# Patient Record
Sex: FEMALE | Race: WHITE | NOT HISPANIC OR LATINO | Employment: FULL TIME
[De-identification: names, ages, dates, MRNs, and addresses within clinical notes are randomized per-mention and may not be internally consistent; named-entity substitution may affect disease eponyms.]

---

## 2017-08-26 ENCOUNTER — HEALTH MAINTENANCE LETTER (OUTPATIENT)
Age: 42
End: 2017-08-26

## 2019-11-07 ENCOUNTER — HEALTH MAINTENANCE LETTER (OUTPATIENT)
Age: 44
End: 2019-11-07

## 2020-07-01 ENCOUNTER — OFFICE VISIT - RIVER FALLS (OUTPATIENT)
Dept: FAMILY MEDICINE | Facility: CLINIC | Age: 45
End: 2020-07-01

## 2020-07-01 ASSESSMENT — MIFFLIN-ST. JEOR: SCORE: 1970.36

## 2020-11-29 ENCOUNTER — HEALTH MAINTENANCE LETTER (OUTPATIENT)
Age: 45
End: 2020-11-29

## 2021-02-14 ENCOUNTER — HEALTH MAINTENANCE LETTER (OUTPATIENT)
Age: 46
End: 2021-02-14

## 2021-09-25 ENCOUNTER — HEALTH MAINTENANCE LETTER (OUTPATIENT)
Age: 46
End: 2021-09-25

## 2022-01-15 ENCOUNTER — HEALTH MAINTENANCE LETTER (OUTPATIENT)
Age: 47
End: 2022-01-15

## 2022-02-11 VITALS
HEIGHT: 65 IN | BODY MASS INDEX: 48.82 KG/M2 | DIASTOLIC BLOOD PRESSURE: 86 MMHG | TEMPERATURE: 97 F | SYSTOLIC BLOOD PRESSURE: 124 MMHG | HEART RATE: 69 BPM | OXYGEN SATURATION: 98 % | WEIGHT: 293 LBS

## 2022-02-16 NOTE — PROGRESS NOTES
Patient:   SAIRA EASON            MRN: [id number]            FIN: [id number]               Age:   45 years     Sex:  Female     :  1975   Associated Diagnoses:   Pre-employment examination   Author:   Catracho Mcmillan PA-C      Report Summary   Diagnosis  Pre-employment examination ([id number] Z02.1).  Patient Instructions   Visit Information      Date of Service: 2020 09:00 am  Performing Location: [facility]  Encounter#: [id number]      Primary Care Provider (PCP):  97 -UNKNOWN,      Referring Provider:  Catracho Mcmillan PA-C    NPI# [id number]   Visit type:  Pre-employment exam   .    Accompanied by:  No one.    Source of history:  Self.    Referral source:  Self.    History limitation:  None.       Chief Complaint   2020 8:52 AM CDT     [facility] [employer], non dot drug screen        Well Adult History   Well Adult History             The patient presents for well adult exam.  The patient's general health status is described as good.  The patient's diet is described as balanced.  Exercise: routine.  Associated symptoms consist of none.  Last menstrual period: regular.  Additional pertinent history: daily caffeine use, tobacco use none and alcohol use socially.        Review of Systems   Constitutional:  Negative.    Eye:  Negative.    Respiratory:  Negative.    Cardiovascular:  Negative.    Breast:  Negative.    Gastrointestinal:  Negative.    Genitourinary:  Negative.    Gynecologic:  Negative.    Hematology/Lymphatics:  Negative.    Endocrine:  Negative.    Immunologic:  Negative.    Musculoskeletal:  Negative.    Integumentary:  Negative.    Neurologic:  Negative.    Psychiatric:  Negative.       Health Status   Allergies:    Allergic Reactions (All)  No Known Medication Allergies   Medications:  (Selected)   Documented Medications  Documented  Birth Control: Birth Control, 0 Refill(s), Type: Maintenance  Ventolin HFA: Inhale, q6 hrs, 0 Refill(s), Type:  Maintenance  venlafaxine: Oral, 0 Refill(s), Type: Maintenance,    Medications          *denotes recorded medication          *Birth Control: 0 Refill(s).          *Ventolin HFA: Inhale, q6 hrs, 0 Refill(s).          *venlafaxine: Oral, 0 Refill(s).       Problem list:    All Problems  Morbid obesity / SNOMED CT 222613200 / Probable      Histories   Past Medical History:    No active or resolved past medical history items have been selected or recorded.   Family History:    No family history items have been selected or recorded.   Procedure history:    No active procedure history items have been selected or recorded.   Social History:             No active social history items have been recorded.      Physical Examination   Vital Signs   7/1/2020 8:52 AM CDT Temperature Tympanic 97.0 DegF  LOW    Peripheral Pulse Rate 69 bpm    HR Method Manual    Systolic Blood Pressure 124 mmHg    Diastolic Blood Pressure 86 mmHg    Mean Arterial Pressure 99 mmHg    BP Site Right arm    BP Method Manual    Oxygen Saturation 98 %      Measurements from flowsheet : Measurements   7/1/2020 8:52 AM CDT Height Measured - Standard 65 in    Weight Measured - Standard 294.2 lb    BSA 2.47 m2    Body Mass Index 48.95 kg/m2  HI      General:  Alert and oriented, No acute distress.    Eye:  Pupils are equal, round and reactive to light, Extraocular movements are intact, Normal conjunctiva.    HENT:  Normocephalic, Tympanic membranes are clear, Normal hearing, Oral mucosa is moist, No pharyngeal erythema.    Neck:  Supple, Non-tender, No lymphadenopathy, No thyromegaly.    Respiratory:  Lungs are clear to auscultation, Respirations are non-labored, Breath sounds are equal.    Cardiovascular:  Normal rate, Regular rhythm, No murmur.    Gastrointestinal:  Soft, Non-tender, Non-distended, No organomegaly.    Genitourinary:  No costovertebral angle tenderness.    Musculoskeletal:  Normal range of motion, Normal strength, No tenderness, No  swelling.    Integumentary:  Warm, Pink, No rash.    Neurologic:  Alert, Oriented, Normal sensory, Normal motor function, No focal deficits.    Psychiatric:  Cooperative, Appropriate mood & affect.       Impression and Plan   Diagnosis     Pre-employment examination ([id number] Z02.1).     Patient Instructions:       Counseled: Patient, Verbalized understanding.     Cleared for employment without restriction.

## 2022-02-16 NOTE — NURSING NOTE
Comprehensive Intake Entered On:  7/1/2020 9:07 AM CDT    Performed On:  7/1/2020 8:52 AM CDT by Gabriela Wolff CMA               Summary   Chief Complaint :   [facility] [place] [facility], non dot drug screen   Weight Measured :   294.2 lb(Converted to: 294 lb 3 oz, 133.45 kg)    Height Measured :   65 in(Converted to: 5 ft 5 in, 165.10 cm)    Body Mass Index :   48.95 kg/m2 (HI)    Body Surface Area :   2.47 m2   Systolic Blood Pressure :   124 mmHg   Diastolic Blood Pressure :   86 mmHg   Mean Arterial Pressure :   99 mmHg   Peripheral Pulse Rate :   69 bpm   BP Site :   Right arm   BP Method :   Manual   HR Method :   Manual   Temperature Tympanic :   97.0 DegF(Converted to: 36.1 DegC)  (LOW)    Oxygen Saturation :   98 %   Gabriela Wolff CMA - 7/1/2020 8:52 AM CDT   Health Status   Allergies Verified? :   Yes   Medication History Verified? :   Yes   Medical History Verified? :   Yes   Pre-Visit Planning Status :   Completed   Tobacco Use? :   Never smoker   Gabriela Wolff CMA - 7/1/2020 8:52 AM CDT   Consents   Consent for Immunization Exchange :   Consent Granted   Consent for Immunizations to Providers :   Consent Granted   Gabriela Wolff CMA - 7/1/2020 8:52 AM CDT   Meds / Allergies   (As Of: 7/1/2020 9:07:55 AM CDT)   Allergies (Active)   No Known Medication Allergies  Estimated Onset Date:   Unspecified ; Created By:   Gabriela Wolff CMA; Reaction Status:   Active ; Category:   Drug ; Substance:   No Known Medication Allergies ; Type:   Allergy ; Updated By:   Gabriela Wolff CMA; Reviewed Date:   7/1/2020 9:06 AM CDT        Medication List   (As Of: 7/1/2020 9:07:55 AM CDT)   Home Meds    albuterol  :   albuterol ; Status:   Documented ; Ordered As Mnemonic:   Ventolin HFA ; Simple Display Line:   Inhale, q6 hrs, 0 Refill(s) ; Catalog Code:   albuterol ; Order Dt/Tm:   7/1/2020 9:06:00 AM CDT          Miscellaneous Prescription  :   Miscellaneous Prescription ; Status:   Documented ; Ordered As Mnemonic:    Birth Control ; Simple Display Line:   0 Refill(s) ; Catalog Code:   Miscellaneous Prescription ; Order Dt/Tm:   7/1/2020 9:05:52 AM CDT          venlafaxine  :   venlafaxine ; Status:   Documented ; Ordered As Mnemonic:   venlafaxine ; Simple Display Line:   Oral, 0 Refill(s) ; Catalog Code:   venlafaxine ; Order Dt/Tm:   7/1/2020 9:05:46 AM CDT            Vision Testing POC   Corrective Lenses :   Glasses   Eye, Left with Correction Visual Acuity :   20/15   Eye, Right with Correction Visual Acuity :   20/15   Eye, Bilat w/Correction Visual Acuity :   20/15   Gabriela Wolff CMA - 7/1/2020 9:17 AM CDT   ID Risk Screen   Recent Travel History :   No recent travel   Family Member Travel History :   No recent travel   Other Exposure to Infectious Disease :   Unknown   Gabriela Wolff CMA - 7/1/2020 8:52 AM CDT

## 2022-03-06 ENCOUNTER — HEALTH MAINTENANCE LETTER (OUTPATIENT)
Age: 47
End: 2022-03-06

## 2022-12-26 ENCOUNTER — HEALTH MAINTENANCE LETTER (OUTPATIENT)
Age: 47
End: 2022-12-26

## 2023-04-16 ENCOUNTER — HEALTH MAINTENANCE LETTER (OUTPATIENT)
Age: 48
End: 2023-04-16

## 2023-12-26 ENCOUNTER — TRANSFERRED RECORDS (OUTPATIENT)
Dept: MULTI SPECIALTY CLINIC | Facility: CLINIC | Age: 48
End: 2023-12-26

## 2024-01-04 ENCOUNTER — TRANSFERRED RECORDS (OUTPATIENT)
Dept: HEALTH INFORMATION MANAGEMENT | Facility: CLINIC | Age: 49
End: 2024-01-04

## 2024-01-22 ENCOUNTER — TRANSFERRED RECORDS (OUTPATIENT)
Dept: HEALTH INFORMATION MANAGEMENT | Facility: CLINIC | Age: 49
End: 2024-01-22

## 2024-01-22 ENCOUNTER — MEDICAL CORRESPONDENCE (OUTPATIENT)
Dept: HEALTH INFORMATION MANAGEMENT | Facility: CLINIC | Age: 49
End: 2024-01-22

## 2024-01-23 ENCOUNTER — TRANSFERRED RECORDS (OUTPATIENT)
Dept: HEALTH INFORMATION MANAGEMENT | Facility: CLINIC | Age: 49
End: 2024-01-23

## 2024-03-07 ENCOUNTER — OFFICE VISIT (OUTPATIENT)
Dept: FAMILY MEDICINE | Facility: CLINIC | Age: 49
End: 2024-03-07
Payer: COMMERCIAL

## 2024-03-07 VITALS
TEMPERATURE: 98.7 F | WEIGHT: 264 LBS | RESPIRATION RATE: 16 BRPM | SYSTOLIC BLOOD PRESSURE: 125 MMHG | DIASTOLIC BLOOD PRESSURE: 86 MMHG | BODY MASS INDEX: 43.99 KG/M2 | OXYGEN SATURATION: 99 % | HEIGHT: 65 IN | HEART RATE: 72 BPM

## 2024-03-07 DIAGNOSIS — R79.89 ELEVATED SERUM CREATININE: Primary | ICD-10-CM

## 2024-03-07 DIAGNOSIS — R03.0 ELEVATED BLOOD PRESSURE READING WITHOUT DIAGNOSIS OF HYPERTENSION: ICD-10-CM

## 2024-03-07 LAB
ALBUMIN UR-MCNC: NEGATIVE MG/DL
APPEARANCE UR: ABNORMAL
BACTERIA #/AREA URNS HPF: ABNORMAL /HPF
BASOPHILS # BLD AUTO: 0.1 10E3/UL (ref 0–0.2)
BASOPHILS NFR BLD AUTO: 1 %
BILIRUB UR QL STRIP: NEGATIVE
COLOR UR AUTO: YELLOW
EOSINOPHIL # BLD AUTO: 0.2 10E3/UL (ref 0–0.7)
EOSINOPHIL NFR BLD AUTO: 3 %
ERYTHROCYTE [DISTWIDTH] IN BLOOD BY AUTOMATED COUNT: 12.5 % (ref 10–15)
GLUCOSE UR STRIP-MCNC: NEGATIVE MG/DL
HCT VFR BLD AUTO: 43.6 % (ref 35–47)
HGB BLD-MCNC: 14.6 G/DL (ref 11.7–15.7)
HGB UR QL STRIP: ABNORMAL
IMM GRANULOCYTES # BLD: 0 10E3/UL
IMM GRANULOCYTES NFR BLD: 0 %
KETONES UR STRIP-MCNC: ABNORMAL MG/DL
LEUKOCYTE ESTERASE UR QL STRIP: NEGATIVE
LYMPHOCYTES # BLD AUTO: 2.3 10E3/UL (ref 0.8–5.3)
LYMPHOCYTES NFR BLD AUTO: 27 %
MCH RBC QN AUTO: 29.7 PG (ref 26.5–33)
MCHC RBC AUTO-ENTMCNC: 33.5 G/DL (ref 31.5–36.5)
MCV RBC AUTO: 89 FL (ref 78–100)
MONOCYTES # BLD AUTO: 0.6 10E3/UL (ref 0–1.3)
MONOCYTES NFR BLD AUTO: 7 %
NEUTROPHILS # BLD AUTO: 5.3 10E3/UL (ref 1.6–8.3)
NEUTROPHILS NFR BLD AUTO: 63 %
NITRATE UR QL: NEGATIVE
PH UR STRIP: 6 [PH] (ref 5–7)
PLATELET # BLD AUTO: 269 10E3/UL (ref 150–450)
RBC # BLD AUTO: 4.91 10E6/UL (ref 3.8–5.2)
RBC #/AREA URNS AUTO: ABNORMAL /HPF
SP GR UR STRIP: 1.01 (ref 1–1.03)
SQUAMOUS #/AREA URNS AUTO: ABNORMAL /LPF
UROBILINOGEN UR STRIP-ACNC: 0.2 E.U./DL
WBC # BLD AUTO: 8.5 10E3/UL (ref 4–11)
WBC #/AREA URNS AUTO: ABNORMAL /HPF

## 2024-03-07 PROCEDURE — 36415 COLL VENOUS BLD VENIPUNCTURE: CPT | Performed by: INTERNAL MEDICINE

## 2024-03-07 PROCEDURE — 80048 BASIC METABOLIC PNL TOTAL CA: CPT | Performed by: INTERNAL MEDICINE

## 2024-03-07 PROCEDURE — 85025 COMPLETE CBC W/AUTO DIFF WBC: CPT | Mod: QW | Performed by: INTERNAL MEDICINE

## 2024-03-07 PROCEDURE — 86038 ANTINUCLEAR ANTIBODIES: CPT | Performed by: INTERNAL MEDICINE

## 2024-03-07 PROCEDURE — 82043 UR ALBUMIN QUANTITATIVE: CPT | Performed by: INTERNAL MEDICINE

## 2024-03-07 PROCEDURE — 82570 ASSAY OF URINE CREATININE: CPT | Performed by: INTERNAL MEDICINE

## 2024-03-07 PROCEDURE — 81001 URINALYSIS AUTO W/SCOPE: CPT | Performed by: INTERNAL MEDICINE

## 2024-03-07 PROCEDURE — 99204 OFFICE O/P NEW MOD 45 MIN: CPT | Performed by: INTERNAL MEDICINE

## 2024-03-07 RX ORDER — DESVENLAFAXINE 50 MG/1
50 TABLET, FILM COATED, EXTENDED RELEASE ORAL DAILY
COMMUNITY

## 2024-03-07 ASSESSMENT — PATIENT HEALTH QUESTIONNAIRE - PHQ9
SUM OF ALL RESPONSES TO PHQ QUESTIONS 1-9: 3
SUM OF ALL RESPONSES TO PHQ QUESTIONS 1-9: 3

## 2024-03-07 NOTE — LETTER
3/7/2024         RE: Mary Parr   [address] [place] [id number]        Dear Colleague,    Thank you for referring your patient, Mary Parr, to the [facility]. Please see a copy of my visit note below.      Assessment & Plan  Problem List Items Addressed This Visit          Other    Elevated serum creatinine - Primary     Referred to renal clinic for Scr 1.2 (eGFR 48mL/min); measured in context of biliary colic (elective cholecystectomy soon after), the patient denies any significant hypovolemic insults; reportedly with normal eating behaviors at time of lab completion  9/2023: comparator Scr of 0.9 (eGFR ~70s)  1/2024: renal US (reports not available); though reportedly normal  No hemodynamic mediating meds; no NSAIDs use  No vascular dz history, no diabetes    3/2024: repeat Scr improved to 1.0, eGFR 70mL/min  - UA: 10-25 WBCs, 2-5 RBCs (in context of mod squams, + bacteria)  - JENY  - BAN    Given relative improvement in renal function back to normal GFR and historically with similar baseline in Sept, 2023 - I would interpret as normal overall renal function  - would recommend reassessment of Scr, GFR later this year to ensure stability         Relevant Orders    Basic metabolic panel (Completed)    Albumin Random Urine Quantitative with Creat Ratio (Completed)    UA with Microscopic (Completed)    CBC with Platelets & Differential (Completed)    Anti Nuclear Moraima IgG by IFA with Reflex (Completed)    Urine Microscopic Exam (Completed)    Elevated blood pressure reading without diagnosis of hypertension     Historically without hypertension; usually with systolic pressures in the 110s when measured in primary care clinic         Relevant Orders    Basic metabolic panel (Completed)    Albumin Random Urine Quantitative with Creat Ratio (Completed)    UA with Microscopic (Completed)    CBC with Platelets & Differential (Completed)    Anti Nuclear Moraima IgG by IFA with  "Reflex (Completed)    Urine Microscopic Exam (Completed)          45 minutes spent by me on the date of the encounter doing chart review, review of outside records, review of test results, interpretation of tests, and patient visit      BMI  Estimated body mass index is 43.93 kg/m  as calculated from the following:    Height as of this encounter: 1.651 m (5' 5\").    Weight as of this encounter: 119.7 kg (264 lb).           Stoney Hernandez is a 48 year old, presenting for the following health issues: Is referred to me by Dr. Forrest through [facility] for evaluation of elevated creatinine.  She had labs obtained in late December and subsequently the following week in early January in the context of biliary colic.  Had significant transaminase elevation which ultimately led to cholecystectomy.  Labs at that time showed a creatinine of 1.2 on both occasions, estimated GFR 48 mL/min.  She does share preceding labs completed in September drawn by her [facility] health provider with a creatinine of 0.9.  No known history of underlying kidney disease.  No significant family history of any renal disorder.  Uses NSAIDs sparingly without any regular daily use.  Outside of some intermittent psoriasis no other known autoimmune disorders.  She has had 3 pregnancies, the last at age 34; no history of any pregnancy-induced hypertension or preeclampsia or gestational diabetes.  Historically with normotensive status; no history of any hypertension  Referral (Kidney function lab results - referred by [facility] - Nephrology Referral)      3/7/2024     3:21 PM   Additional Questions   Roomed by ALICIA Mesa   Accompanied by self     Via the Health Maintenance questionnaire, the patient has reported the following services have been completed -Mammogram, this information has been sent to the abstraction team.  History of Present Illness       Reason for visit:  Kidney function concerns  Symptom onset:  More than a " "month  Symptoms include:  No symptoms  Symptom intensity:  Mild  Symptom progression:  Staying the same  Had these symptoms before:  No  What makes it worse:  No  What makes it better:  No    She eats 0-1 servings of fruits and vegetables daily.She consumes 0 sweetened beverage(s) daily.She exercises with enough effort to increase her heart rate 10 to 19 minutes per day.  She exercises with enough effort to increase her heart rate 4 days per week. She is missing 1 dose(s) of medications per week.         Review of Systems  Constitutional, HEENT, cardiovascular, pulmonary, gi and gu systems are negative, except as otherwise noted.      Objective    BP (!) 152/83   Pulse 72   Temp 98.7  F (37.1  C) (Tympanic)   Resp 16   Ht 1.651 m (5' 5\")   Wt 119.7 kg (264 lb)   LMP 02/18/2024 (Exact Date)   SpO2 99%   BMI 43.93 kg/m    Body mass index is 43.93 kg/m .  Physical Exam   GENERAL: alert and no distress  NECK: no adenopathy, no asymmetry, masses, or scars  RESP: lungs clear to auscultation - no rales, rhonchi or wheezes  CV: regular rate and rhythm, normal S1 S2, no S3 or S4, no murmur, click or rub, no peripheral edema  ABDOMEN: soft, nontender, no hepatosplenomegaly, no masses and bowel sounds normal  MS: no gross musculoskeletal defects noted, no edema    Results for orders placed or performed in visit on 03/07/24   Basic metabolic panel     Status: Abnormal   Result Value Ref Range    Sodium 139 135 - 145 mmol/L    Potassium 4.0 3.4 - 5.3 mmol/L    Chloride 105 98 - 107 mmol/L    Carbon Dioxide (CO2) 26 22 - 29 mmol/L    Anion Gap 8 7 - 15 mmol/L    Urea Nitrogen 10.6 6.0 - 20.0 mg/dL    Creatinine 0.98 (H) 0.51 - 0.95 mg/dL    GFR Estimate 70 >60 mL/min/1.73m2    Calcium 9.1 8.6 - 10.0 mg/dL    Glucose 91 70 - 99 mg/dL   Albumin Random Urine Quantitative with Creat Ratio     Status: None   Result Value Ref Range    Creatinine Urine mg/dL 111.0 mg/dL    Albumin Urine mg/L <12.0 mg/L    Albumin Urine mg/g Cr   "   UA with Microscopic     Status: Abnormal   Result Value Ref Range    Color Urine Yellow Colorless, Straw, Light Yellow, Yellow    Appearance Urine Slightly Cloudy (A) Clear    Glucose Urine Negative Negative mg/dL    Bilirubin Urine Negative Negative    Ketones Urine Trace (A) Negative mg/dL    Specific Gravity Urine 1.015 1.003 - 1.035    Blood Urine Trace (A) Negative    pH Urine 6.0 5.0 - 7.0    Protein Albumin Urine Negative Negative mg/dL    Urobilinogen Urine 0.2 0.2, 1.0 E.U./dL    Nitrite Urine Negative Negative    Leukocyte Esterase Urine Negative Negative   Anti Nuclear Moraima IgG by IFA with Reflex     Status: Normal   Result Value Ref Range    BAN interpretation Negative Negative   CBC with platelets and differential     Status: None   Result Value Ref Range    WBC Count 8.5 4.0 - 11.0 10e3/uL    RBC Count 4.91 3.80 - 5.20 10e6/uL    Hemoglobin 14.6 11.7 - 15.7 g/dL    Hematocrit 43.6 35.0 - 47.0 %    MCV 89 78 - 100 fL    MCH 29.7 26.5 - 33.0 pg    MCHC 33.5 31.5 - 36.5 g/dL    RDW 12.5 10.0 - 15.0 %    Platelet Count 269 150 - 450 10e3/uL    % Neutrophils 63 %    % Lymphocytes 27 %    % Monocytes 7 %    % Eosinophils 3 %    % Basophils 1 %    % Immature Granulocytes 0 %    Absolute Neutrophils 5.3 1.6 - 8.3 10e3/uL    Absolute Lymphocytes 2.3 0.8 - 5.3 10e3/uL    Absolute Monocytes 0.6 0.0 - 1.3 10e3/uL    Absolute Eosinophils 0.2 0.0 - 0.7 10e3/uL    Absolute Basophils 0.1 0.0 - 0.2 10e3/uL    Absolute Immature Granulocytes 0.0 <=0.4 10e3/uL   Urine Microscopic Exam     Status: Abnormal   Result Value Ref Range    Bacteria Urine Many (A) None Seen /HPF    RBC Urine 2-5 (A) 0-2 /HPF /HPF    WBC Urine 10-25 (A) 0-5 /HPF /HPF    Squamous Epithelials Urine Moderate (A) None Seen /LPF   CBC with Platelets & Differential     Status: None    Narrative    The following orders were created for panel order CBC with Platelets & Differential.  Procedure                               Abnormality         Status                      ---------                               -----------         ------                     CBC with platelets and d...[[id number]]                      Final result                 Please view results for these tests on the individual orders.           Signed Electronically by: Iglesia Dawson MD      Again, thank you for allowing me to participate in the care of your patient.        Sincerely,        Iglesia Dawson MD

## 2024-03-07 NOTE — ASSESSMENT & PLAN NOTE
Historically without hypertension; usually with systolic pressures in the 110s when measured in primary care clinic

## 2024-03-07 NOTE — PROGRESS NOTES
Assessment & Plan   Problem List Items Addressed This Visit          Other    Elevated serum creatinine - Primary     Referred to renal clinic for Scr 1.2 (eGFR 48mL/min); measured in context of biliary colic (elective cholecystectomy soon after), the patient denies any significant hypovolemic insults; reportedly with normal eating behaviors at time of lab completion  9/2023: comparator Scr of 0.9 (eGFR ~70s)  1/2024: renal US (reports not available); though reportedly normal  No hemodynamic mediating meds; no NSAIDs use  No vascular dz history, no diabetes    3/2024: repeat Scr improved to 1.0, eGFR 70mL/min  - UA: 10-25 WBCs, 2-5 RBCs (in context of mod squams, + bacteria)  - JENY  - BAN    Given relative improvement in renal function back to normal GFR and historically with similar baseline in Sept, 2023 - I would interpret as normal overall renal function  - would recommend reassessment of Scr, GFR later this year to ensure stability         Relevant Orders    Basic metabolic panel (Completed)    Albumin Random Urine Quantitative with Creat Ratio (Completed)    UA with Microscopic (Completed)    CBC with Platelets & Differential (Completed)    Anti Nuclear Moraima IgG by IFA with Reflex (Completed)    Urine Microscopic Exam (Completed)    Elevated blood pressure reading without diagnosis of hypertension     Historically without hypertension; usually with systolic pressures in the 110s when measured in primary care clinic         Relevant Orders    Basic metabolic panel (Completed)    Albumin Random Urine Quantitative with Creat Ratio (Completed)    UA with Microscopic (Completed)    CBC with Platelets & Differential (Completed)    Anti Nuclear Moraima IgG by IFA with Reflex (Completed)    Urine Microscopic Exam (Completed)          45 minutes spent by me on the date of the encounter doing chart review, review of outside records, review of test results, interpretation of tests, and patient visit      BMI  Estimated body  "mass index is 43.93 kg/m  as calculated from the following:    Height as of this encounter: 1.651 m (5' 5\").    Weight as of this encounter: 119.7 kg (264 lb).           Stoney Hernandez is a 48 year old, presenting for the following health issues: Is referred to me by Dr. Forrest through [facility] for evaluation of elevated creatinine.  She had labs obtained in late December and subsequently the following week in early January in the context of biliary colic.  Had significant transaminase elevation which ultimately led to cholecystectomy.  Labs at that time showed a creatinine of 1.2 on both occasions, estimated GFR 48 mL/min.  She does share preceding labs completed in September drawn by her [facility] health provider with a creatinine of 0.9.  No known history of underlying kidney disease.  No significant family history of any renal disorder.  Uses NSAIDs sparingly without any regular daily use.  Outside of some intermittent psoriasis no other known autoimmune disorders.  She has had 3 pregnancies, the last at age 34; no history of any pregnancy-induced hypertension or preeclampsia or gestational diabetes.  Historically with normotensive status; no history of any hypertension  Referral (Kidney function lab results - referred by [facility] - Nephrology Referral)      3/7/2024     3:21 PM   Additional Questions   Roomed by ALICIA Mesa   Accompanied by self     Via the Health Maintenance questionnaire, the patient has reported the following services have been completed -Mammogram, this information has been sent to the abstraction team.  History of Present Illness       Reason for visit:  Kidney function concerns  Symptom onset:  More than a month  Symptoms include:  No symptoms  Symptom intensity:  Mild  Symptom progression:  Staying the same  Had these symptoms before:  No  What makes it worse:  No  What makes it better:  No    She eats 0-1 servings of fruits and vegetables daily.She consumes 0 " "sweetened beverage(s) daily.She exercises with enough effort to increase her heart rate 10 to 19 minutes per day.  She exercises with enough effort to increase her heart rate 4 days per week. She is missing 1 dose(s) of medications per week.         Review of Systems  Constitutional, HEENT, cardiovascular, pulmonary, gi and gu systems are negative, except as otherwise noted.      Objective    BP (!) 152/83   Pulse 72   Temp 98.7  F (37.1  C) (Tympanic)   Resp 16   Ht 1.651 m (5' 5\")   Wt 119.7 kg (264 lb)   LMP 02/18/2024 (Exact Date)   SpO2 99%   BMI 43.93 kg/m    Body mass index is 43.93 kg/m .  Physical Exam   GENERAL: alert and no distress  NECK: no adenopathy, no asymmetry, masses, or scars  RESP: lungs clear to auscultation - no rales, rhonchi or wheezes  CV: regular rate and rhythm, normal S1 S2, no S3 or S4, no murmur, click or rub, no peripheral edema  ABDOMEN: soft, nontender, no hepatosplenomegaly, no masses and bowel sounds normal  MS: no gross musculoskeletal defects noted, no edema    Results for orders placed or performed in visit on 03/07/24   Basic metabolic panel     Status: Abnormal   Result Value Ref Range    Sodium 139 135 - 145 mmol/L    Potassium 4.0 3.4 - 5.3 mmol/L    Chloride 105 98 - 107 mmol/L    Carbon Dioxide (CO2) 26 22 - 29 mmol/L    Anion Gap 8 7 - 15 mmol/L    Urea Nitrogen 10.6 6.0 - 20.0 mg/dL    Creatinine 0.98 (H) 0.51 - 0.95 mg/dL    GFR Estimate 70 >60 mL/min/1.73m2    Calcium 9.1 8.6 - 10.0 mg/dL    Glucose 91 70 - 99 mg/dL   Albumin Random Urine Quantitative with Creat Ratio     Status: None   Result Value Ref Range    Creatinine Urine mg/dL 111.0 mg/dL    Albumin Urine mg/L <12.0 mg/L    Albumin Urine mg/g Cr     UA with Microscopic     Status: Abnormal   Result Value Ref Range    Color Urine Yellow Colorless, Straw, Light Yellow, Yellow    Appearance Urine Slightly Cloudy (A) Clear    Glucose Urine Negative Negative mg/dL    Bilirubin Urine Negative Negative    " Ketones Urine Trace (A) Negative mg/dL    Specific Gravity Urine 1.015 1.003 - 1.035    Blood Urine Trace (A) Negative    pH Urine 6.0 5.0 - 7.0    Protein Albumin Urine Negative Negative mg/dL    Urobilinogen Urine 0.2 0.2, 1.0 E.U./dL    Nitrite Urine Negative Negative    Leukocyte Esterase Urine Negative Negative   Anti Nuclear Moraima IgG by IFA with Reflex     Status: Normal   Result Value Ref Range    BAN interpretation Negative Negative   CBC with platelets and differential     Status: None   Result Value Ref Range    WBC Count 8.5 4.0 - 11.0 10e3/uL    RBC Count 4.91 3.80 - 5.20 10e6/uL    Hemoglobin 14.6 11.7 - 15.7 g/dL    Hematocrit 43.6 35.0 - 47.0 %    MCV 89 78 - 100 fL    MCH 29.7 26.5 - 33.0 pg    MCHC 33.5 31.5 - 36.5 g/dL    RDW 12.5 10.0 - 15.0 %    Platelet Count 269 150 - 450 10e3/uL    % Neutrophils 63 %    % Lymphocytes 27 %    % Monocytes 7 %    % Eosinophils 3 %    % Basophils 1 %    % Immature Granulocytes 0 %    Absolute Neutrophils 5.3 1.6 - 8.3 10e3/uL    Absolute Lymphocytes 2.3 0.8 - 5.3 10e3/uL    Absolute Monocytes 0.6 0.0 - 1.3 10e3/uL    Absolute Eosinophils 0.2 0.0 - 0.7 10e3/uL    Absolute Basophils 0.1 0.0 - 0.2 10e3/uL    Absolute Immature Granulocytes 0.0 <=0.4 10e3/uL   Urine Microscopic Exam     Status: Abnormal   Result Value Ref Range    Bacteria Urine Many (A) None Seen /HPF    RBC Urine 2-5 (A) 0-2 /HPF /HPF    WBC Urine 10-25 (A) 0-5 /HPF /HPF    Squamous Epithelials Urine Moderate (A) None Seen /LPF   CBC with Platelets & Differential     Status: None    Narrative    The following orders were created for panel order CBC with Platelets & Differential.  Procedure                               Abnormality         Status                     ---------                               -----------         ------                     CBC with platelets and d...[[id number]]                      Final result                 Please view results for these tests on the individual orders.            Signed Electronically by: Iglesia Dawson MD

## 2024-03-07 NOTE — ASSESSMENT & PLAN NOTE
Referred to renal clinic for Scr 1.2 (eGFR 48mL/min); measured in context of biliary colic (elective cholecystectomy soon after), the patient denies any significant hypovolemic insults; reportedly with normal eating behaviors at time of lab completion  9/2023: comparator Scr of 0.9 (eGFR ~70s)  1/2024: renal US (reports not available); though reportedly normal  No hemodynamic mediating meds; no NSAIDs use  No vascular dz history, no diabetes    3/2024: repeat Scr improved to 1.0, eGFR 70mL/min  - UA: 10-25 WBCs, 2-5 RBCs (in context of mod squams, + bacteria)  - JENY  - BAN    Given relative improvement in renal function back to normal GFR and historically with similar baseline in Sept, 2023 - I would interpret as normal overall renal function  - would recommend reassessment of Scr, GFR later this year to ensure stability

## 2024-03-08 ENCOUNTER — MYC MEDICAL ADVICE (OUTPATIENT)
Dept: FAMILY MEDICINE | Facility: CLINIC | Age: 49
End: 2024-03-08
Payer: COMMERCIAL

## 2024-03-08 DIAGNOSIS — R79.89 ELEVATED SERUM CREATININE: Primary | ICD-10-CM

## 2024-03-08 LAB
ANA SER QL IF: NEGATIVE
ANION GAP SERPL CALCULATED.3IONS-SCNC: 8 MMOL/L (ref 7–15)
BUN SERPL-MCNC: 10.6 MG/DL (ref 6–20)
CALCIUM SERPL-MCNC: 9.1 MG/DL (ref 8.6–10)
CHLORIDE SERPL-SCNC: 105 MMOL/L (ref 98–107)
CREAT SERPL-MCNC: 0.98 MG/DL (ref 0.51–0.95)
CREAT UR-MCNC: 111 MG/DL
DEPRECATED HCO3 PLAS-SCNC: 26 MMOL/L (ref 22–29)
EGFRCR SERPLBLD CKD-EPI 2021: 70 ML/MIN/1.73M2
GLUCOSE SERPL-MCNC: 91 MG/DL (ref 70–99)
MICROALBUMIN UR-MCNC: <12 MG/L
MICROALBUMIN/CREAT UR: NORMAL MG/G{CREAT}
POTASSIUM SERPL-SCNC: 4 MMOL/L (ref 3.4–5.3)
SODIUM SERPL-SCNC: 139 MMOL/L (ref 135–145)

## 2024-06-23 ENCOUNTER — HEALTH MAINTENANCE LETTER (OUTPATIENT)
Age: 49
End: 2024-06-23

## 2024-11-21 ENCOUNTER — TELEPHONE (OUTPATIENT)
Dept: FAMILY MEDICINE | Facility: CLINIC | Age: 49
End: 2024-11-21
Payer: COMMERCIAL

## 2024-11-21 NOTE — TELEPHONE ENCOUNTER
Patient Returning Call    Reason for call:  Patient trying to schedule 6 mos follow up for kidneys but nothing available until JAN - hoping to get in before end of year - Please call and advise     Information relayed to patient:  messsage sent     Patient has additional questions:  Yes    What are your questions/concerns:  Patient trying to schedule 6 mos follow up for kidneys but nothing available until JAN - hoping to get in before end of year - Please call and advise     Could we send this information to you in [facility] or would you prefer to receive a phone call?:   Patient would prefer a phone call   Okay to leave a detailed message?: Yes at Home number on file [phone] (home)

## 2024-11-25 NOTE — TELEPHONE ENCOUNTER
CSS called pt.   She is now rescheduled for 12/12/2024 at 530 with arrival time of 510 with Dr. Jimmy Dawson for 6 month follow up.

## 2024-12-12 ENCOUNTER — OFFICE VISIT (OUTPATIENT)
Dept: FAMILY MEDICINE | Facility: CLINIC | Age: 49
End: 2024-12-12
Payer: COMMERCIAL

## 2024-12-12 VITALS
DIASTOLIC BLOOD PRESSURE: 82 MMHG | BODY MASS INDEX: 48.32 KG/M2 | OXYGEN SATURATION: 100 % | SYSTOLIC BLOOD PRESSURE: 134 MMHG | HEART RATE: 72 BPM | WEIGHT: 290 LBS | TEMPERATURE: 98 F | RESPIRATION RATE: 20 BRPM | HEIGHT: 65 IN

## 2024-12-12 DIAGNOSIS — R03.0 ELEVATED BLOOD PRESSURE READING WITHOUT DIAGNOSIS OF HYPERTENSION: Primary | ICD-10-CM

## 2024-12-12 DIAGNOSIS — R79.89 ELEVATED SERUM CREATININE: ICD-10-CM

## 2024-12-12 LAB
ALBUMIN UR-MCNC: NEGATIVE MG/DL
APPEARANCE UR: ABNORMAL
BACTERIA #/AREA URNS HPF: ABNORMAL /HPF
BILIRUB UR QL STRIP: NEGATIVE
COLOR UR AUTO: YELLOW
GLUCOSE UR STRIP-MCNC: NEGATIVE MG/DL
HGB UR QL STRIP: ABNORMAL
KETONES UR STRIP-MCNC: NEGATIVE MG/DL
LEUKOCYTE ESTERASE UR QL STRIP: ABNORMAL
MUCOUS THREADS #/AREA URNS LPF: PRESENT /LPF
NITRATE UR QL: NEGATIVE
PH UR STRIP: 5.5 [PH] (ref 5–7)
RBC #/AREA URNS AUTO: ABNORMAL /HPF
SP GR UR STRIP: 1.02 (ref 1–1.03)
SQUAMOUS #/AREA URNS AUTO: ABNORMAL /LPF
UROBILINOGEN UR STRIP-ACNC: 0.2 E.U./DL
WBC #/AREA URNS AUTO: ABNORMAL /HPF

## 2024-12-12 RX ORDER — CLOTRIMAZOLE AND BETAMETHASONE DIPROPIONATE 10; .64 MG/G; MG/G
CREAM TOPICAL 2 TIMES DAILY
COMMUNITY
Start: 2024-11-01

## 2024-12-12 RX ORDER — TRIAMCINOLONE ACETONIDE 0.25 MG/G
OINTMENT TOPICAL
COMMUNITY
Start: 2024-09-11

## 2024-12-12 RX ORDER — TACROLIMUS 1 MG/G
OINTMENT TOPICAL
COMMUNITY
Start: 2024-11-05

## 2024-12-12 RX ORDER — BECLOMETHASONE DIPROPIONATE HFA 80 UG/1
2 AEROSOL, METERED RESPIRATORY (INHALATION) 2 TIMES DAILY
COMMUNITY
Start: 2024-11-01

## 2024-12-12 RX ORDER — PHENTERMINE HYDROCHLORIDE 30 MG/1
30 CAPSULE ORAL DAILY
COMMUNITY
Start: 2024-11-01

## 2024-12-12 RX ORDER — CLOBETASOL PROPIONATE 0.5 MG/ML
SOLUTION TOPICAL
COMMUNITY
Start: 2024-12-03

## 2024-12-12 RX ORDER — TOPIRAMATE 25 MG/1
TABLET, FILM COATED ORAL
COMMUNITY
Start: 2024-11-01

## 2024-12-12 ASSESSMENT — PATIENT HEALTH QUESTIONNAIRE - PHQ9
10. IF YOU CHECKED OFF ANY PROBLEMS, HOW DIFFICULT HAVE THESE PROBLEMS MADE IT FOR YOU TO DO YOUR WORK, TAKE CARE OF THINGS AT HOME, OR GET ALONG WITH OTHER PEOPLE: NOT DIFFICULT AT ALL
SUM OF ALL RESPONSES TO PHQ QUESTIONS 1-9: 0
SUM OF ALL RESPONSES TO PHQ QUESTIONS 1-9: 0

## 2024-12-12 NOTE — PROGRESS NOTES
"  Assessment & Plan   Problem List Items Addressed This Visit          Other    Elevated serum creatinine     Referred to renal clinic for Scr 1.2 (eGFR 48mL/min); measured in context of biliary colic (elective cholecystectomy soon after), the patient denies any significant hypovolemic insults; reportedly with normal eating behaviors at time of lab completion  9/2023: comparator Scr of 0.9 (eGFR ~70s)  1/2024: renal US (reports not available); though reportedly normal    3/2024: repeat Scr improved to 1.0, eGFR 70mL/min  - UA: 10-25 WBCs, 2-5 RBCs (in context of mod squams, + bacteria)  - JENY  - BAN    12/2024: Return for kidney recheck evaluation.  Labs repeated today -currently pending  -Assuming stable overall GFR without any active renal sediment or albuminuria, not anticipating any further kidney follow-up         Elevated blood pressure reading without diagnosis of hypertension - Primary    Relevant Orders    Albumin Random Urine Quantitative with Creat Ratio             BMI  Estimated body mass index is 48.26 kg/m  as calculated from the following:    Height as of this encounter: 1.651 m (5' 5\").    Weight as of this encounter: 131.5 kg (290 lb).       No dedicated renal follow-up necessary      Subjective   Mary is a 49 year old, presenting for the following health issues: Returns for follow-up from original referral earlier this year.  Had transient decline in GFR at time of acute cholecystitis and laparoscopic cholecystectomy.  Subsequent workup this year in March demonstrated reversibility in GFR, with a creatinine near baseline 1.0 with an estimated GFR of 70 mL/min.  She was asked to follow-up at current time to reassess overall function.  No significant change in overall health.  Has been exploring several pursuits for weight loss strategies; currently using topiramate phentermine combination.  Insurance not currently approving GLP-1 agonist  Kidney Problem (Pt here for a Nephrology Follow-up )      " "12/12/2024     5:08 PM   Additional Questions   Roomed by Alexandru ADAMS     Via the Health Maintenance questionnaire, the patient has reported the following services have been completed -Cervical Cancer Screening: [facility] 2022-12-20, this information has been sent to the abstraction team.  Kidney Problem    History of Present Illness       Reason for visit:  Kidney function issues    She eats 2-3 servings of fruits and vegetables daily.She consumes 0 sweetened beverage(s) daily.She exercises with enough effort to increase her heart rate 10 to 19 minutes per day.  She exercises with enough effort to increase her heart rate 3 or less days per week.   She is taking medications regularly.         Review of Systems  Constitutional, HEENT, cardiovascular, pulmonary, gi and gu systems are negative, except as otherwise noted.      Objective    /82 (BP Location: Right arm, Patient Position: Sitting, Cuff Size: Adult Large)   Pulse 72   Temp 98  F (36.7  C) (Tympanic)   Resp 20   Ht 1.651 m (5' 5\")   Wt 131.5 kg (290 lb)   SpO2 100%   BMI 48.26 kg/m    Body mass index is 48.26 kg/m .  Physical Exam   GENERAL: alert and no distress  NECK: no adenopathy, no asymmetry, masses, or scars  RESP: lungs clear to auscultation - no rales, rhonchi or wheezes  CV: regular rate and rhythm, normal S1 S2, no S3 or S4, no murmur, click or rub, no peripheral edema  MS: no gross musculoskeletal defects noted, no edema    Office Visit on 03/07/2024   Component Date Value Ref Range Status    Sodium 03/07/2024 139  135 - 145 mmol/L Final    Reference intervals for this test were updated on 09/26/2023 to more accurately reflect our healthy population. There may be differences in the flagging of prior results with similar values performed with this method. Interpretation of those prior results can be made in the context of the updated reference intervals.     Potassium 03/07/2024 4.0  3.4 - 5.3 mmol/L Final    Chloride 03/07/2024 " 105  98 - 107 mmol/L Final    Carbon Dioxide (CO2) 03/07/2024 26  22 - 29 mmol/L Final    Anion Gap 03/07/2024 8  7 - 15 mmol/L Final    Urea Nitrogen 03/07/2024 10.6  6.0 - 20.0 mg/dL Final    Creatinine 03/07/2024 0.98 (H)  0.51 - 0.95 mg/dL Final    GFR Estimate 03/07/2024 70  >60 mL/min/1.73m2 Final    Calcium 03/07/2024 9.1  8.6 - 10.0 mg/dL Final    Glucose 03/07/2024 91  70 - 99 mg/dL Final    Creatinine Urine mg/dL 03/07/2024 111.0  mg/dL Final    The reference ranges have not been established in urine creatinine. The results should be integrated into the clinical context for interpretation.    Albumin Urine mg/L 03/07/2024 <12.0  mg/L Final    The reference ranges have not been established in urine albumin. The results should be integrated into the clinical context for interpretation.    Albumin Urine mg/g Cr 03/07/2024    Final    Unable to calculate, urine albumin and/or urine creatinine is outside detectable limits.  Microalbuminuria is defined as an albumin:creatinine ratio of 17 to 299 for males and 25 to 299 for females. A ratio of albumin:creatinine of 300 or higher is indicative of overt proteinuria.  Due to biologic variability, positive results should be confirmed by a second, first-morning random or 24-hour timed urine specimen. If there is discrepancy, a third specimen is recommended. When 2 out of 3 results are in the microalbuminuria range, this is evidence for incipient nephropathy and warrants increased efforts at glucose control, blood pressure control, and institution of therapy with an angiotensin-converting-enzyme (ACE) inhibitor (if the patient can tolerate it).      Color Urine 03/07/2024 Yellow  Colorless, Straw, Light Yellow, Yellow Final    Appearance Urine 03/07/2024 Slightly Cloudy (A)  Clear Final    Glucose Urine 03/07/2024 Negative  Negative mg/dL Final    Bilirubin Urine 03/07/2024 Negative  Negative Final    Ketones Urine 03/07/2024 Trace (A)  Negative mg/dL Final    Specific  Gravity Urine 03/07/2024 1.015  1.003 - 1.035 Final    Blood Urine 03/07/2024 Trace (A)  Negative Final    pH Urine 03/07/2024 6.0  5.0 - 7.0 Final    Protein Albumin Urine 03/07/2024 Negative  Negative mg/dL Final    Urobilinogen Urine 03/07/2024 0.2  0.2, 1.0 E.U./dL Final    Nitrite Urine 03/07/2024 Negative  Negative Final    Leukocyte Esterase Urine 03/07/2024 Negative  Negative Final    BAN interpretation 03/07/2024 Negative  Negative Final      Negative:              <1:40  Borderline Positive:   1:40 - 1:80  Positive:              >1:80    WBC Count 03/07/2024 8.5  4.0 - 11.0 10e3/uL Final    RBC Count 03/07/2024 4.91  3.80 - 5.20 10e6/uL Final    Hemoglobin 03/07/2024 14.6  11.7 - 15.7 g/dL Final    Hematocrit 03/07/2024 43.6  35.0 - 47.0 % Final    MCV 03/07/2024 89  78 - 100 fL Final    MCH 03/07/2024 29.7  26.5 - 33.0 pg Final    MCHC 03/07/2024 33.5  31.5 - 36.5 g/dL Final    RDW 03/07/2024 12.5  10.0 - 15.0 % Final    Platelet Count 03/07/2024 269  150 - 450 10e3/uL Final    % Neutrophils 03/07/2024 63  % Final    % Lymphocytes 03/07/2024 27  % Final    % Monocytes 03/07/2024 7  % Final    % Eosinophils 03/07/2024 3  % Final    % Basophils 03/07/2024 1  % Final    % Immature Granulocytes 03/07/2024 0  % Final    Absolute Neutrophils 03/07/2024 5.3  1.6 - 8.3 10e3/uL Final    Absolute Lymphocytes 03/07/2024 2.3  0.8 - 5.3 10e3/uL Final    Absolute Monocytes 03/07/2024 0.6  0.0 - 1.3 10e3/uL Final    Absolute Eosinophils 03/07/2024 0.2  0.0 - 0.7 10e3/uL Final    Absolute Basophils 03/07/2024 0.1  0.0 - 0.2 10e3/uL Final    Absolute Immature Granulocytes 03/07/2024 0.0  <=0.4 10e3/uL Final    Bacteria Urine 03/07/2024 Many (A)  None Seen /HPF Final    RBC Urine 03/07/2024 2-5 (A)  0-2 /HPF /HPF Final    WBC Urine 03/07/2024 10-25 (A)  0-5 /HPF /HPF Final    Squamous Epithelials Urine 03/07/2024 Moderate (A)  None Seen /LPF Final           Signed Electronically by: Iglesia Dawson MD

## 2024-12-12 NOTE — ASSESSMENT & PLAN NOTE
Referred to renal clinic for Scr 1.2 (eGFR 48mL/min); measured in context of biliary colic (elective cholecystectomy soon after), the patient denies any significant hypovolemic insults; reportedly with normal eating behaviors at time of lab completion  9/2023: comparator Scr of 0.9 (eGFR ~70s)  1/2024: renal US (reports not available); though reportedly normal    3/2024: repeat Scr improved to 1.0, eGFR 70mL/min  - UA: 10-25 WBCs, 2-5 RBCs (in context of mod squams, + bacteria)  - JENY  - BAN    12/2024: Return for kidney recheck evaluation.  Labs repeated today -currently pending  -Assuming stable overall GFR without any active renal sediment or albuminuria, not anticipating any further kidney follow-up

## 2025-07-12 ENCOUNTER — HEALTH MAINTENANCE LETTER (OUTPATIENT)
Age: 50
End: 2025-07-12